# Patient Record
Sex: MALE | Race: OTHER
[De-identification: names, ages, dates, MRNs, and addresses within clinical notes are randomized per-mention and may not be internally consistent; named-entity substitution may affect disease eponyms.]

---

## 2017-03-15 ENCOUNTER — APPOINTMENT (OUTPATIENT)
Dept: INTERNAL MEDICINE | Facility: CLINIC | Age: 42
End: 2017-03-15

## 2017-04-05 ENCOUNTER — APPOINTMENT (OUTPATIENT)
Dept: INTERNAL MEDICINE | Facility: CLINIC | Age: 42
End: 2017-04-05

## 2017-04-05 ENCOUNTER — OUTPATIENT (OUTPATIENT)
Dept: OUTPATIENT SERVICES | Facility: HOSPITAL | Age: 42
LOS: 1 days | Discharge: HOME | End: 2017-04-05

## 2017-04-05 VITALS
SYSTOLIC BLOOD PRESSURE: 117 MMHG | HEART RATE: 65 BPM | WEIGHT: 182 LBS | HEIGHT: 61 IN | BODY MASS INDEX: 34.36 KG/M2 | DIASTOLIC BLOOD PRESSURE: 68 MMHG

## 2017-04-05 DIAGNOSIS — R74.8 ABNORMAL LEVELS OF OTHER SERUM ENZYMES: ICD-10-CM

## 2017-04-05 DIAGNOSIS — E78.2 MIXED HYPERLIPIDEMIA: ICD-10-CM

## 2017-04-05 DIAGNOSIS — E11.9 TYPE 2 DIABETES MELLITUS W/OUT COMPLICATIONS: ICD-10-CM

## 2017-04-05 RX ORDER — ATORVASTATIN CALCIUM 20 MG/1
20 TABLET, FILM COATED ORAL
Qty: 30 | Refills: 5 | Status: ACTIVE | COMMUNITY
Start: 2017-04-05 | End: 1900-01-01

## 2017-04-14 ENCOUNTER — APPOINTMENT (OUTPATIENT)
Dept: NUTRITION | Facility: CLINIC | Age: 42
End: 2017-04-14

## 2017-06-14 ENCOUNTER — APPOINTMENT (OUTPATIENT)
Dept: INTERNAL MEDICINE | Facility: CLINIC | Age: 42
End: 2017-06-14

## 2017-06-27 DIAGNOSIS — I71.2 THORACIC AORTIC ANEURYSM, WITHOUT RUPTURE: ICD-10-CM

## 2017-06-27 DIAGNOSIS — Z01.818 ENCOUNTER FOR OTHER PREPROCEDURAL EXAMINATION: ICD-10-CM

## 2017-06-27 DIAGNOSIS — M16.11 UNILATERAL PRIMARY OSTEOARTHRITIS, RIGHT HIP: ICD-10-CM

## 2017-07-24 ENCOUNTER — OUTPATIENT (OUTPATIENT)
Dept: OUTPATIENT SERVICES | Facility: HOSPITAL | Age: 42
LOS: 1 days | Discharge: HOME | End: 2017-07-24

## 2017-07-24 ENCOUNTER — EMERGENCY (EMERGENCY)
Facility: HOSPITAL | Age: 42
LOS: 0 days | Discharge: HOME | End: 2017-07-24

## 2017-07-24 DIAGNOSIS — Z23 ENCOUNTER FOR IMMUNIZATION: ICD-10-CM

## 2017-07-24 DIAGNOSIS — Z87.891 PERSONAL HISTORY OF NICOTINE DEPENDENCE: ICD-10-CM
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
## 2017-07-24 DIAGNOSIS — T15.02XA FOREIGN BODY IN CORNEA, LEFT EYE, INITIAL ENCOUNTER: ICD-10-CM

## 2017-07-24 DIAGNOSIS — H57.12 OCULAR PAIN, LEFT EYE: ICD-10-CM
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
## 2017-07-24 DIAGNOSIS — S05.02XA INJURY OF CONJUNCTIVA AND CORNEAL ABRASION WITHOUT FOREIGN BODY, LEFT EYE, INITIAL ENCOUNTER: ICD-10-CM
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
## 2018-01-30 ENCOUNTER — EMERGENCY (EMERGENCY)
Facility: HOSPITAL | Age: 43
LOS: 0 days | Discharge: HOME | End: 2018-01-30

## 2018-01-30 DIAGNOSIS — T15.02XA FOREIGN BODY IN CORNEA, LEFT EYE, INITIAL ENCOUNTER: ICD-10-CM

## 2018-11-20 ENCOUNTER — EMERGENCY (EMERGENCY)
Facility: HOSPITAL | Age: 43
LOS: 0 days | Discharge: HOME | End: 2018-11-20
Admitting: PHYSICIAN ASSISTANT

## 2018-11-20 VITALS
HEART RATE: 75 BPM | OXYGEN SATURATION: 99 % | DIASTOLIC BLOOD PRESSURE: 77 MMHG | RESPIRATION RATE: 18 BRPM | SYSTOLIC BLOOD PRESSURE: 151 MMHG | TEMPERATURE: 97 F

## 2018-11-20 VITALS
RESPIRATION RATE: 18 BRPM | DIASTOLIC BLOOD PRESSURE: 81 MMHG | OXYGEN SATURATION: 98 % | TEMPERATURE: 97 F | SYSTOLIC BLOOD PRESSURE: 138 MMHG | HEART RATE: 71 BPM | WEIGHT: 184.53 LBS

## 2018-11-20 DIAGNOSIS — T15.02XA FOREIGN BODY IN CORNEA, LEFT EYE, INITIAL ENCOUNTER: ICD-10-CM

## 2018-11-20 RX ORDER — POLYMYXIN B SULF/TRIMETHOPRIM 10000-1/ML
2 DROPS OPHTHALMIC (EYE) ONCE
Qty: 0 | Refills: 0 | Status: COMPLETED | OUTPATIENT
Start: 2018-11-20 | End: 2018-11-20

## 2018-11-20 RX ADMIN — Medication 2 DROP(S): at 22:28

## 2018-11-20 NOTE — ED PROVIDER NOTE - PROGRESS NOTE DETAILS
3 FBs removed from cornea with small residual piece remaining in cornea.  Discussed with pt that he must follow up with out pt opthalmology tomorrow at clinic or private doctor.  Pt adds no other complaints at this time.  Discussed results with pt.  All questions were answered and return precautions discussed.  Pt is asx and comfortable at this time.  Unremarkable re-exam.  No further concerns at this time from pt.  Will follow up with PMD.  Pt understands and agrees with tx plan.

## 2018-11-20 NOTE — ED PROVIDER NOTE - PHYSICAL EXAMINATION
CONST: Well appearing in NAD  EYES: PERRL, EOMI, +fluorescin uptake at 3'oclock, 3 FBs of cornea not overlying pupil, no sidels signs, visual acuity 20/40 right eye, 20/50 left eye,  Sclera and conjunctiva clear, pain alleviated with tetracaine   ENT: No nasal discharge.  Oropharynx normal appearing, no erythema or exudates. Uvula midline.  NEURO: A&Ox3, No focal deficits. Strength 5/5 with no sensory deficits. Steady gait

## 2018-11-20 NOTE — ED ADULT TRIAGE NOTE - CHIEF COMPLAINT QUOTE
pt thinks he has pieces of steel in his left eye. He was grinding metal and now his left eye is red.

## 2018-11-20 NOTE — ED PROVIDER NOTE - NSFOLLOWUPCLINICS_GEN_ALL_ED_FT
[facility]  Ophthalmolgy  [address], [address]  [place] [address]  Phone: [phone]  Fax:   Follow Up Time: 1-3 Days

## 2018-11-20 NOTE — ED PROVIDER NOTE - NS ED ROS FT
CONST: No fever, chills or body aches  EYES: + pain, + redness. No drainage or visual changes.  ENT: No ear pain or discharge, nasal discharge or congestion. No sore throat  NEURO: No headache, dizziness, paresthesias or LOC

## 2018-11-20 NOTE — ED PROVIDER NOTE - OBJECTIVE STATEMENT
43 y.o male with no sig PMhx presents to the ED for evaluation of FB of left eye since this afternoon.  Pt states that he was cutting with metal saw when he felt acute pain of his left eye.  Since onset noticed that eye was red and irritated.  Admits to mild pain of eye with no alleviating or exacerbating factors. Denies changes in visual acuity, headache, dizziness, N/V, diplopia.  UTD on tetanus.  [id number] used.

## 2018-11-20 NOTE — ED PROVIDER NOTE - MEDICAL DECISION MAKING DETAILS
I have discussed the discharge plan with the patient. The patient agrees with the plan, as discussed.  The patient understands Emergency Department diagnosis is a preliminary diagnosis often based on limited information and that the patient must adhere to the follow-up plan as discussed.  The patient understands that if the symptoms worsen or if prescribed medications do not have the desired/planned effect that the patient may return to the Emergency Department at any time for further evaluation and treatment

## 2020-04-23 ENCOUNTER — EMERGENCY (EMERGENCY)
Facility: HOSPITAL | Age: 45
LOS: 0 days | Discharge: HOME | End: 2020-04-23
Admitting: EMERGENCY MEDICINE
Payer: MEDICAID

## 2020-04-23 VITALS
SYSTOLIC BLOOD PRESSURE: 144 MMHG | HEART RATE: 77 BPM | RESPIRATION RATE: 18 BRPM | DIASTOLIC BLOOD PRESSURE: 82 MMHG | OXYGEN SATURATION: 99 % | TEMPERATURE: 98 F

## 2020-04-23 DIAGNOSIS — H57.10 OCULAR PAIN, UNSPECIFIED EYE: ICD-10-CM
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
## 2020-04-23 DIAGNOSIS — S05.01XA INJURY OF CONJUNCTIVA AND CORNEAL ABRASION WITHOUT FOREIGN BODY, RIGHT EYE, INITIAL ENCOUNTER: ICD-10-CM
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
## 2020-04-23 DIAGNOSIS — F17.200 NICOTINE DEPENDENCE, UNSPECIFIED, UNCOMPLICATED: ICD-10-CM

## 2020-04-23 PROCEDURE — 99283 EMERGENCY DEPT VISIT LOW MDM: CPT

## 2020-04-23 RX ORDER — POLYMYXIN B SULF/TRIMETHOPRIM 10000-1/ML
1 DROPS OPHTHALMIC (EYE) ONCE
Refills: 0 | Status: COMPLETED | OUTPATIENT
Start: 2020-04-23 | End: 2020-04-23

## 2020-04-23 RX ADMIN — Medication 1 DROP(S): at 11:49

## 2020-04-23 NOTE — ED PROVIDER NOTE - NSFOLLOWUPINSTRUCTIONS_ED_ALL_ED_FT
PLEASE PLACE 1 DROP IN RIGHT EYE 4 TIMES A DAY FOR 7 DAYS.     Corneal Abrasion    The cornea is the clear covering at the front and center of the eye. This very thin tissue is made up of many layers. If a scratch or injury causes the corneal epithelium to come off, it is called a corneal abrasion. Symptoms include eye pain, difficulty keeping eye open, and light sensitivity. Do not drive or operate machinery if your eye is patched.    SEEK MEDICAL CARE IF YOU HAVE THE FOLLOWING SYMPTOMS: discharge from eyes, changes in vision, worsening of symptoms.

## 2020-04-23 NOTE — ED PROVIDER NOTE - NSFOLLOWUPCLINICS_GEN_ALL_ED_FT
[facility]  Ophthalmolgy  [address], [address]  [place] [address]  Phone: [phone]  Fax:   Follow Up Time: 1-3 Days

## 2020-04-23 NOTE — ED PROVIDER NOTE - CLINICAL SUMMARY MEDICAL DECISION MAKING FREE TEXT BOX
45y M presenting to ED with FB to right eye. vitals stable. Vision 20/20. eye fluorescein stained showed uptake of dye at the 12 o clock position. consistent with corneal abrasion. eyelid everted no metal FB visualized. no hyphema, no sidal sign.Pt started on polytrim eye drops to right eye and eye clinic follow up given. Strict follow up with eye clinic and return precautions given. pt endorses understanding.

## 2020-04-23 NOTE — ED ADULT TRIAGE NOTE - PATIENT ON (OXYGEN DELIVERY METHOD)
How Severe Is Your Skin Lesion?: mild
Has Your Skin Lesion Been Treated?: not been treated
Is This A New Presentation, Or A Follow-Up?: Skin Lesion
Which Family Member (Optional)?: Brother
room air

## 2020-04-23 NOTE — ED PROVIDER NOTE - PHYSICAL EXAMINATION
GEN: Alert & Oriented x 3, No acute distress. Calm, appropriate.  Head and Neck: Normocephalic, atraumatic. No cervical lymphadenopathy.  ENT:Oral mucosa pink, moist without lesions.   Eyes: PERRL. EOMI. (+) conjunctival injection. No hyphema. Fluorescin stained with stain uptake to 12 o'clock position to right cornea. eyelid everted. no FB visualized. no dylan sign. No scleral icterus. OD/OS/OU Vision 20/20. No periorbital swelling or erythema.   SKIN: no rashes/lesions, no petechiae, no ecchymosis.

## 2020-04-23 NOTE — ED PROVIDER NOTE - OBJECTIVE STATEMENT
The pt is a 45y M with no PMH is presenting to ED with eye pain x 1 day. Pt states he was working and had a piece of metal fly in right eye. Pt endorsing mod, constant pain to right eye. associated with redness. no aggravating or relieving factors. Pt denies photophobia, blurry vision, double vision, foreign body in eye, fever, eye swelling. pt does not wear contacts. tdap up to date.

## 2020-04-23 NOTE — ED PROVIDER NOTE - NS ED ROS FT
GEN: (-) fever, (-) chills, (-) malaise  HEENT: (+) eye pain, (-) double vision, (-) photophobia (-) vision changes, (-) HA, (-) sore throat  SKIN: (-) rashes, (-) new lesions  HEME: (-) bleeding, (-) ecchymosis

## 2020-04-23 NOTE — ED PROVIDER NOTE - PATIENT PORTAL LINK FT
You can access the FollowMyHealth Patient Portal offered by [facility] by registering at the following website: [email or web address]. By joining [email or web address]’s FollowMyHealth portal, you will also be able to view your health information using other applications (apps) compatible with our system.

## 2020-04-24 ENCOUNTER — OUTPATIENT (OUTPATIENT)
Dept: OUTPATIENT SERVICES | Facility: HOSPITAL | Age: 45
LOS: 1 days | Discharge: HOME | End: 2020-04-24
Payer: MEDICAID

## 2020-04-24 DIAGNOSIS — S05.00XS: ICD-10-CM

## 2020-04-24 PROCEDURE — 92012 INTRM OPH EXAM EST PATIENT: CPT

## 2020-04-28 DIAGNOSIS — S05.00XA INJURY OF CONJUNCTIVA AND CORNEAL ABRASION WITHOUT FOREIGN BODY, UNSPECIFIED EYE, INITIAL ENCOUNTER: ICD-10-CM

## 2020-09-23 ENCOUNTER — EMERGENCY (EMERGENCY)
Facility: HOSPITAL | Age: 45
LOS: 0 days | Discharge: HOME | End: 2020-09-23
Attending: EMERGENCY MEDICINE | Admitting: EMERGENCY MEDICINE
Payer: MEDICAID

## 2020-09-23 VITALS
DIASTOLIC BLOOD PRESSURE: 77 MMHG | RESPIRATION RATE: 16 BRPM | TEMPERATURE: 99 F | SYSTOLIC BLOOD PRESSURE: 136 MMHG | OXYGEN SATURATION: 97 % | HEART RATE: 85 BPM
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
## 2020-09-23 DIAGNOSIS — H57.89 OTHER SPECIFIED DISORDERS OF EYE AND ADNEXA: ICD-10-CM
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
## 2020-09-23 DIAGNOSIS — Z23 ENCOUNTER FOR IMMUNIZATION: ICD-10-CM

## 2020-09-23 DIAGNOSIS — S05.01XA INJURY OF CONJUNCTIVA AND CORNEAL ABRASION WITHOUT FOREIGN BODY, RIGHT EYE, INITIAL ENCOUNTER: ICD-10-CM
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
## 2020-09-23 PROCEDURE — 99283 EMERGENCY DEPT VISIT LOW MDM: CPT

## 2020-09-23 RX ORDER — TETANUS TOXOID, REDUCED DIPHTHERIA TOXOID AND ACELLULAR PERTUSSIS VACCINE, ADSORBED 5; 2.5; 8; 8; 2.5 [IU]/.5ML; [IU]/.5ML; UG/.5ML; UG/.5ML; UG/.5ML
0.5 SUSPENSION INTRAMUSCULAR ONCE
Refills: 0 | Status: COMPLETED | OUTPATIENT
Start: 2020-09-23 | End: 2020-09-23

## 2020-09-23 RX ORDER — POLYMYXIN B SULF/TRIMETHOPRIM 10000-1/ML
1 DROPS OPHTHALMIC (EYE) ONCE
Refills: 0 | Status: COMPLETED | OUTPATIENT
Start: 2020-09-23 | End: 2020-09-23

## 2020-09-23 RX ADMIN — Medication 1 DROP(S): at 18:39

## 2020-09-23 RX ADMIN — TETANUS TOXOID, REDUCED DIPHTHERIA TOXOID AND ACELLULAR PERTUSSIS VACCINE, ADSORBED 0.5 MILLILITER(S): 5; 2.5; 8; 8; 2.5 SUSPENSION INTRAMUSCULAR at 18:40

## 2020-09-23 NOTE — ED ADULT TRIAGE NOTE - CHIEF COMPLAINT QUOTE
Pt states he has "some metal pieces" in his right eye; pt was using a  and got some metal in his eye yesterday afternoon. Pt denies difficulty seeing.

## 2020-09-23 NOTE — ED PROVIDER NOTE - CLINICAL SUMMARY MEDICAL DECISION MAKING FREE TEXT BOX
No foreign body on exam.  (+) Corneal abrasion with fluorescein staining.  Tetanus booster, Abx drops and OTC NSAIDs.

## 2020-09-23 NOTE — ED PROVIDER NOTE - OBJECTIVE STATEMENT
46 y/o male presents to the ED c/o "I was grinding metal yesterday wearing glasses and I felt like some metal got into my right eye. It is red and irritated." no change in vision/ ha/ weakness

## 2020-09-23 NOTE — ED PROVIDER NOTE - NSFOLLOWUPCLINICS_GEN_ALL_ED_FT
[facility]  Ophthalmolgy  [address], [address]  [place] [address]  Phone: [phone]  Fax:   Follow Up Time:

## 2020-09-23 NOTE — ED PROVIDER NOTE - NSFOLLOWUPINSTRUCTIONS_ED_ALL_ED_FT
Corneal Abrasion    The cornea is the clear covering at the front and center of the eye. This very thin tissue is made up of many layers. If a scratch or injury causes the corneal epithelium to come off, it is called a corneal abrasion. Symptoms include eye pain, difficulty keeping eye open, and light sensitivity. Do not drive or operate machinery if your eye is patched.    SEEK MEDICAL CARE IF YOU HAVE THE FOLLOWING SYMPTOMS: discharge from eyes, changes in vision, worsening of symptoms.     Polytrim drops 1 drop right eye every 4 hours for 1 week

## 2020-09-23 NOTE — ED PROVIDER NOTE - CARE PLAN
Principal Discharge DX:	Corneal abrasion, right, initial encounter  Secondary Diagnosis:	Need for tetanus booster

## 2020-09-23 NOTE — ED PROVIDER NOTE - ATTENDING CONTRIBUTION TO CARE
44 y/o male with no relevant PMHx presents to ED with right eye irritation, foreign body sensation s/p grinding metal yesterday.  Was wearing glasses.  No blurry or double vision.  No other complaints.  Tetanus not up to date.  PE:  agree with above.  A/P:  Corneal Abrasion, r/o foreign body.

## 2020-09-23 NOTE — ED PROVIDER NOTE - PATIENT PORTAL LINK FT
You can access the FollowMyHealth Patient Portal offered by [facility] by registering at the following website: [email or web address]. By joining [email or web address] [email or web address] portal, you will also be able to view your health information using other applications (apps) compatible with our system.

## 2020-11-15 ENCOUNTER — EMERGENCY (EMERGENCY)
Facility: HOSPITAL | Age: 45
LOS: 0 days | Discharge: HOME | End: 2020-11-15
Attending: EMERGENCY MEDICINE | Admitting: EMERGENCY MEDICINE
Payer: MEDICAID

## 2020-11-15 VITALS
HEART RATE: 59 BPM | TEMPERATURE: 97 F | OXYGEN SATURATION: 98 % | RESPIRATION RATE: 18 BRPM | DIASTOLIC BLOOD PRESSURE: 60 MMHG | SYSTOLIC BLOOD PRESSURE: 122 MMHG

## 2020-11-15 DIAGNOSIS — H20.012 PRIMARY IRIDOCYCLITIS, LEFT EYE: ICD-10-CM

## 2020-11-15 DIAGNOSIS — T15.02XA FOREIGN BODY IN CORNEA, LEFT EYE, INITIAL ENCOUNTER: ICD-10-CM

## 2020-11-15 DIAGNOSIS — H53.9 UNSPECIFIED VISUAL DISTURBANCE: ICD-10-CM
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
## 2020-11-15 DIAGNOSIS — S05.02XA INJURY OF CONJUNCTIVA AND CORNEAL ABRASION WITHOUT FOREIGN BODY, LEFT EYE, INITIAL ENCOUNTER: ICD-10-CM

## 2020-11-15 PROCEDURE — 99283 EMERGENCY DEPT VISIT LOW MDM: CPT

## 2020-11-15 RX ORDER — POLYMYXIN B SULF/TRIMETHOPRIM 10000-1/ML
1 DROPS OPHTHALMIC (EYE) ONCE
Refills: 0 | Status: COMPLETED | OUTPATIENT
Start: 2020-11-15 | End: 2020-11-15

## 2020-11-15 RX ADMIN — Medication 1 DROP(S): at 10:06

## 2020-11-15 NOTE — ED PROCEDURE NOTE - ATTENDING CONTRIBUTION TO CARE
I was present for and supervised the key critical aspects of the procedures performed during the care of the patient.  QTIP used to remove

## 2020-11-15 NOTE — ED PROVIDER NOTE - NS ED ROS FT
Constitutional: See HPI.  Eyes: see hpi   ENMT: No hearing changes, pain, discharge or infections.   Cardiac: No SOB or edema. No chest pain with exertion.  Respiratory: No cough or respiratory distress. No hemoptysis. No history of asthma or RAD.  Neuro: No headache   Skin: No skin rash.  Except as documented in the HPI, all other systems are negative.

## 2020-11-15 NOTE — ED ADULT NURSE NOTE - NSIMPLEMENTINTERV_GEN_ALL_ED
Implemented All Universal Safety Interventions:  [place] to call system. Call bell, personal items and telephone within reach. Instruct patient to call for assistance. Room bathroom lighting operational. Non-slip footwear when patient is off stretcher. Physically safe environment: no spills, clutter or unnecessary equipment. Stretcher in lowest position, wheels locked, appropriate side rails in place.

## 2020-11-15 NOTE — ED PROVIDER NOTE - OBJECTIVE STATEMENT
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 Patient denies fevers/chills, no other complaints.

## 2020-11-15 NOTE — ED PROVIDER NOTE - ATTENDING CONTRIBUTION TO CARE
45yr old male, non contact lens wearer her for left eye complaint. 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  now having pain to left eye. on exam pt with left conjunctival injection, small tiny black FB to 11 O'clock position, perrl eomi, vision grossly intact, + abrasion onfluroscein, negative dylan    impression FB with abrasion, FB removed, drops given, optho outpt advised

## 2020-11-15 NOTE — ED PROVIDER NOTE - PATIENT PORTAL LINK FT
You can access the FollowMyHealth Patient Portal offered by [facility] by registering at the following website: [email or web address]. By joining [employer]’s FollowMyHealth portal, you will also be able to view your health information using other applications (apps) compatible with our system.

## 2020-11-15 NOTE — ED PROVIDER NOTE - PHYSICAL EXAMINATION
VITAL SIGNS: I have reviewed nursing notes and confirm.  CONSTITUTIONAL: well-appearing, non-toxic, NAD  SKIN: Warm dry, normal skin turgor  HEAD: NCAT  EYES: EOMI, PERRLA, no scleral icterus, left eye injected with noted 11oclock corneal abrasion and foreign body, + consensual light reflex   ENT: Moist mucous membranes,  NECK: Supple; non tender. Full ROM.   CARD: RRR, no murmurs, rubs or gallops  RESP: clear to ausculation b/l.  No rales, rhonchi, or wheezing.  NEURO: Normal gait.  PSYCH: Cooperative, appropriate.

## 2020-11-15 NOTE — ED PROVIDER NOTE - NSFOLLOWUPCLINICS_GEN_ALL_ED_FT
[facility]  Ophthalmolgy  [address], [address]  [place] [address]  Phone: [phone]  Fax:   Follow Up Time: 1-3 Days

## 2020-11-15 NOTE — ED PROVIDER NOTE - NSFOLLOWUPINSTRUCTIONS_ED_ALL_ED_FT
Corneal Abrasion    The cornea is the clear covering at the front and center of the eye. This very thin tissue is made up of many layers. If a scratch or injury causes the corneal epithelium to come off, it is called a corneal abrasion. Symptoms include eye pain, redness, tearing, difficulty keeping eye open, and light sensitivity. Do not drive or operate machinery if your eye is patched.  Antibiotic eye drops or ointment may be prescribed to reduce the risk of infection.  It is important to follow up with an ophthalmologist (eye doctor) to ensure proper healing.    SEEK IMMEDIATE MEDICAL CARE IF YOU HAVE ANY OF THE FOLLOWING SYMPTOMS: discharge from eyes, changes in vision, fever, or swelling or if symptoms worsen    Ear Foreign Body  Medically reviewed on Dec 21, 2018      Care Notes Overview|Aftercare Instructions|Ambulatory Care|Discharge Care|Inpatient Care|En EspañolWHAT YOU SHOULD KNOW:  Advertisement•An ear foreign body is anything that gets stuck in your ear canal other than earwax. This may include food, toy pieces, beads, buttons, disk batteries, cotton swab, paper, or insects. Foreign bodies are usually trapped in the outer ear canal. The outer ear canal, or external auditory canal, is the tube from the opening of your ear to the eardrum. You may have pain or fullness in the ear, or trouble hearing if the ear canal is blocked. Blood or thick discharge (drainage) may come out from the affected ear. If the foreign body is an insect, you may feel movement or hear buzzing.        •Diagnosis of ear foreign body may include a detailed health history and careful checking of the ear. Removal of the foreign body from the ear is the main goal of treatment. This may be done using gentle flushing of the ear canal with warm water, suction, or instruments. Live insects are usually killed with a liquid before being removed. Surgery may be needed to remove a deep foreign body or treat ear damage. With treatment, the foreign body will be removed from the ear, and more serious problems can be prevented.  AFTER YOU LEAVE:  Medicines:  •Keep a current list of your medicines: Include the amounts, and when, how, and why you take them. Take the list or the pill bottles to follow-up visits. Carry your medicine list with you in case of an emergency. Throw away old medicine lists. Use vitamins, herbs, or food supplements only as directed.  •Take your medicine as directed: Call your primary healthcare provider if you think your medicine is not working as expected. Tell him about any medicine allergies, and if you want to quit taking or change your medicine.  •Antibiotics: This medicine is given to fight or prevent an infection caused by bacteria. Always take your antibiotics exactly as ordered by your primary healthcare provider. Do not stop taking your medicine unless directed by your primary healthcare provider. Never save antibiotics or take leftover antibiotics that were given to you for another illness. Make sure that the tip of the bottle does not touch your skin if you are using ear drops.  Ask for information about where and when to go for follow-up visits:  For continuing care, treatments, or home services, ask for more information.    What to do if you have a foreign body stuck in your ear:  •Do not put anything in your ear to try to take the foreign body out. This may push the object deeper into the ear canal.  •If you feel a live insect moving in your ear, you may place a few drops of mineral oil in your ear. This will help kill the bug and decrease discomfort. Do not drop oil in the ear canal if you have a known tear in your eardrum. Do not put your finger inside your ear.  •Teach your child to tell an adult right away if he has symptoms of a foreign body in his ear.  CONTACT A CAREGIVER IF:  •You have a fever.  •You have trouble hearing, or you hear ringing sounds.  •You have questions or concerns about your condition, treatment, or care.  SEEK CARE IMMEDIATELY IF:  •You feel dizzy.  •You have discharge or blood coming out from your ear.  •Your ear pain does not go away or gets worse

## 2020-11-15 NOTE — ED PROVIDER NOTE - CARE PLAN
Principal Discharge DX:	Corneal abrasion  Secondary Diagnosis:	Traumatic iritis  Secondary Diagnosis:	Foreign body in eye

## 2021-09-02 NOTE — ED PROVIDER NOTE - NS ED ATTENDING STATEMENT MOD
I have personally performed a face to face diagnostic evaluation on this patient. I have reviewed the ACP note and agree with the history, exam and plan of care, except as noted.
self/Yes

## 2021-10-13 NOTE — ED ADULT TRIAGE NOTE - ARRIVAL FROM
Final Ascites Aerobic Bacterial Culture report is NEGATIVE.    No treatment or change in treatment per [facility] Lab Result culture protocol.  Home

## 2022-08-12 NOTE — ED PROCEDURE NOTE - PROCEDURE DATE TIME, MLM
25-Nov-2018 16:27 Ketoconazole Counseling:   Patient counseled regarding improving absorption with orange juice.  Adverse effects include but are not limited to breast enlargement, headache, diarrhea, nausea, upset stomach, liver function test abnormalities, taste disturbance, and stomach pain.  There is a rare possibility of liver failure that can occur when taking ketoconazole. The patient understands that monitoring of LFTs may be required, especially at baseline. The patient verbalized understanding of the proper use and possible adverse effects of ketoconazole.  All of the patient's questions and concerns were addressed.

## 2023-02-28 ENCOUNTER — EMERGENCY (EMERGENCY)
Facility: HOSPITAL | Age: 48
LOS: 0 days | Discharge: HOME | End: 2023-02-28
Attending: EMERGENCY MEDICINE
Payer: MEDICAID

## 2023-02-28 VITALS
TEMPERATURE: 98 F | SYSTOLIC BLOOD PRESSURE: 162 MMHG | HEART RATE: 64 BPM | DIASTOLIC BLOOD PRESSURE: 90 MMHG | OXYGEN SATURATION: 96 % | RESPIRATION RATE: 20 BRPM
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
## 2023-02-28 DIAGNOSIS — T15.02XA FOREIGN BODY IN CORNEA, LEFT EYE, INITIAL ENCOUNTER: ICD-10-CM
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
## 2023-02-28 DIAGNOSIS — F17.200 NICOTINE DEPENDENCE, UNSPECIFIED, UNCOMPLICATED: ICD-10-CM

## 2023-02-28 PROCEDURE — 65220 REMOVE FOREIGN BODY FROM EYE: CPT | Mod: LT

## 2023-02-28 PROCEDURE — 99284 EMERGENCY DEPT VISIT MOD MDM: CPT

## 2023-02-28 RX ORDER — MOXIFLOXACIN HCL 0.5 %
1 DROPS OPHTHALMIC (EYE)
Qty: 1 | Refills: 0
Start: 2023-02-28 | End: 2023-03-06

## 2023-02-28 RX ORDER — IBUPROFEN 200 MG
600 TABLET ORAL ONCE
Refills: 0 | Status: COMPLETED | OUTPATIENT
Start: 2023-02-28 | End: 2023-02-28

## 2023-02-28 NOTE — ED PROVIDER NOTE - CLINICAL SUMMARY MEDICAL DECISION MAKING FREE TEXT BOX
TALYAK: language line  for Hungarian    used. 47-year-old male presents with foreign body sensation to left eye after he was cutting metal and felt a piece go into his eye.  Denies any change in visual acuity.  No other complaints.  On exam has punctate metallic foreign body in the left cornea, no other abrasions or uptake on fluorescein exam.  Pablo's negative.  Visual acuity reassuring.  Unable to remove with cotton swab so ophthalmology consulted and able to remove.  Patient discharged with return precautions and ophthalmology follow-up as well as antibiotic drops.

## 2023-02-28 NOTE — ED ADULT TRIAGE NOTE - CCCP TRG CHIEF CMPLNT
.Telephone call with patient. Explained results of phone calls with [facility] and Dr. Kolby Quiñonez. Patient to inform this writer should he not receive his medication/[employer]ul[employer]. foreign body eye

## 2023-02-28 NOTE — CONSULT NOTE ADULT - SUBJECTIVE AND OBJECTIVE BOX
Patient is a 48 y/o man no past ocular hx presents with a 1 day hx of foreign body in his eye. Was shaving metal when he felt something go in his left eye. Has had pain and foreign body sensation since. No other ocular complaints. No flashes, floaters, discharge.    VAcc 20/20, 20/40  IOP 12/10  EOM full  pupils 3-2 no apd     lids/lashes: nml ou  conjunctiva w&q od, trace injection OS  cornea: clear os, .8mm rusted/metal fb in infero nasal cornea paracentrally  iris: rounds and reactive ou  lens: clear ou     DFE     Optic disc: CD .3 ou, sharp and pink  Macula: flat ou  vessels: normal course and caliber ou  periphery: flat, no holes or tears ou     US OS:  no FB noted, no tears or detachments    A&P    Corneal foreign body OS  - 1 day hx of fbs after shaving metal  -.8 mm rusted foreign body paracentrally at around 7 oclock  - Poor view on DFE after corneal manipulation but no FB noted and confirmed with US  Plan:  - FB removed with 25g needle. Moderately deep, nearly full removed although cautiously given depth. Pablo negative before and after removal  - fornices swept  - recommend vigamox drops three times a day in the left eye  - counselled on need for eye protection at work   - return to optho clinic at [facility] tomorrow for re-anthony Jade, PGY2

## 2023-02-28 NOTE — ED PROVIDER NOTE - OBJECTIVE STATEMENT
47 year old M presenting to er with fb sensation to L eye. Pt sts was cutting metal today when felt piece of metal get into eye at 8am. Now having pain/fb sensation and tearing from L eye. No other trauma/injuries. Pt denies any contact lens use, headache, nausea, vomiting, dizziness, double vision.

## 2023-02-28 NOTE — ED PROVIDER NOTE - NSFOLLOWUPCLINICS_GEN_ALL_ED_FT
[facility]  Ophthalmolgy  [address], [address]  [place] [address]  Phone: [phone]  Fax:

## 2023-02-28 NOTE — ED PROVIDER NOTE - NSFOLLOWUPINSTRUCTIONS_ED_ALL_ED_FT
Eye Foreign Body    WHAT YOU NEED TO KNOW:    You may have pain, sensitivity to light, or blurry vision for a few days.     DISCHARGE INSTRUCTIONS:    Return to the emergency department if:     You suddenly lose your vision.       You have severe eye pain.     Contact your healthcare provider or ophthalmologist if:     You have new or worse eye swelling.       Your symptoms do not get better, even after the foreign body is removed.       You have white or yellow fluid draining from your eye.       You have questions or concerns about your condition or care.     Medicines: You may need any of the following:     Eye drops or eye ointment may be given to prevent an infection and decrease pain.       NSAIDs, such as ibuprofen, help decrease swelling, pain, and fever. NSAIDs can cause stomach bleeding or kidney problems in certain people. If you take blood thinner medicine, always ask your healthcare provider if NSAIDs are safe for you. Always read the medicine label and follow directions.      Prescription pain medicine may be given. Ask your healthcare provider how to take this medicine safely. Some prescription pain medicines contain acetaminophen. Do not take other medicines that contain acetaminophen without talking to your healthcare provider. Too much acetaminophen may cause liver damage. Prescription pain medicine may cause constipation. Ask your healthcare provider how to prevent or treat constipation.       Take your medicine as directed. Contact your healthcare provider if you think your medicine is not helping or if you have side effects. Tell him of her if you are allergic to any medicine. Keep a list of the medicines, vitamins, and herbs you take. Include the amounts, and when and why you take them. Bring the list or the pill bottles to follow-up visits. Carry your medicine list with you in case of an emergency.    Help your eye heal:     Do not rub your eye. This may cause more damage or infection.       Do not wear your contacts lenses until your eye heals. Ask your healthcare provider how long to follow this direction.       Wear sunglasses as directed. Sunglasses help protect the eye and decrease sensitivity to light.     Prevent another EFB:     Wear safety glasses, eye shields, or goggles. These items can prevent eye injury. Make sure the eyewear wraps around the sides of your face. Wear these items while you work with chemicals, metal, wood, or bodily fluids such as blood. Also wear protective eyewear during sports such as racquetball or swimming. Do not use regular eye glasses for eye protection. They will not protect your eyes from foreign bodies or chemicals.       Use contact lenses as directed. Wash your hands before you clean, insert, or remove your contacts. Insert and remove contact lenses correctly. Clean and change your contacts as directed to help prevent eye damage or infection.     Follow up with your healthcare provider or ophthalmologist in 1 to 2 days: Write down your questions so you remember to ask them during your visits.       © Copyright [employer] 2019 All illustrations and images included in CareNotes are the copyrighted property of [employer] or [employer].

## 2023-02-28 NOTE — ED PROVIDER NOTE - PHYSICAL EXAMINATION
CONSTITUTIONAL: Well-appearing; well-nourished; in no apparent distress.   EYES: PERRL; EOM intact. VA 20/25 in b/l eyes.  on fluorescin stain L eye + small corneal fb seen at 11 o clock. No ulcerations or abrasions seen  NECK: Supple; non-tender; no cervical lymphadenopathy.   CARDIOVASCULAR: Normal S1, S2; no murmurs, rubs, or gallops.   RESPIRATORY: Normal chest excursion with respiration; breath sounds clear and equal bilaterally; no wheezes, rhonchi, or rales.  MS: No evidence of trauma or deformity. Normal ROM in all four extremities; non-tender to palpation; distal pulses are normal.   SKIN: Normal for age and race; warm; dry; good turgor; no apparent lesions or exudate.   NEURO/PSYCH: A & O x 4; grossly unremarkable. mood and manner are appropriate. Grooming and personal hygiene are appropriate.

## 2024-02-28 ENCOUNTER — EMERGENCY (EMERGENCY)
Facility: HOSPITAL | Age: 49
LOS: 0 days | Discharge: HOME | End: 2024-02-28
Attending: STUDENT IN AN ORGANIZED HEALTH CARE EDUCATION/TRAINING PROGRAM
Payer: MEDICAID

## 2024-02-28 VITALS
OXYGEN SATURATION: 99 % | TEMPERATURE: 99 F | RESPIRATION RATE: 20 BRPM | SYSTOLIC BLOOD PRESSURE: 158 MMHG | DIASTOLIC BLOOD PRESSURE: 78 MMHG | HEART RATE: 67 BPM
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
## 2024-02-28 DIAGNOSIS — S61.011A LACERATION WITHOUT FOREIGN BODY OF RIGHT THUMB WITHOUT DAMAGE TO NAIL, INITIAL ENCOUNTER: ICD-10-CM
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
## 2024-02-28 PROCEDURE — 73130 X-RAY EXAM OF HAND: CPT | Mod: 26,RT

## 2024-02-28 PROCEDURE — 12002 RPR S/N/AX/GEN/TRNK2.6-7.5CM: CPT

## 2024-02-28 PROCEDURE — 99283 EMERGENCY DEPT VISIT LOW MDM: CPT | Mod: 25

## 2024-02-28 PROCEDURE — 73130 X-RAY EXAM OF HAND: CPT | Mod: RT

## 2024-02-28 PROCEDURE — 99284 EMERGENCY DEPT VISIT MOD MDM: CPT | Mod: 25

## 2024-02-28 NOTE — ED PROVIDER NOTE - CLINICAL SUMMARY MEDICAL DECISION MAKING FREE TEXT BOX
48-year-old male, no pertinent past medical history, presenting with right thumb injury while using a table saw, just prior to arrival, pain with touch, no relieving factors, associated symptoms.  Denies other trauma, numbness, tingling, weakness, injuries elsewhere.  Tdap about 1 year ago.  Minimally limited range of motion of right thumb due to pain but able to extend at MCP and PIP as well as flex.  4 cm laceration extending from medial to lateral thumb proximal to distal.  No visible tendon.  Imaging ordered and reviewed.  Wound thoroughly cleansed.  Digital block applied.  Laceration repaired.  Discussed return precautions and follow-up with hand within 24 hours.  Patient comfortable with plan. 48-year-old male, no pertinent past medical history, presenting with right thumb injury while using a table saw, just prior to arrival, pain with touch, no relieving factors, associated symptoms.  Denies other trauma, numbness, tingling, weakness, injuries elsewhere.  Tdap about 1 year ago.  Minimally limited range of motion of right thumb due to pain but able to extend at MCP and PIP as well as flex.  4 cm laceration extending from medial to lateral thumb proximal to distal.  No visible tendon.  Imaging ordered and reviewed.  Wound thoroughly cleansed.  Digital block applied.  Laceration repaired.  Discussed return precautions and follow-up with hand within 24-48 hours.  Patient comfortable with plan.

## 2024-02-28 NOTE — ED PROVIDER NOTE - CARE PROVIDER_API CALL
Gavin Metz  Plastic Surgery  [address] [place] [id number]  Phone: [phone]  Fax: [phone]  Follow Up Time: 1-3 Days

## 2024-02-28 NOTE — ED PROVIDER NOTE - NSFOLLOWUPINSTRUCTIONS_ED_ALL_ED_FT
Please return for suture removal in 10-14 days.  Please follow up with hand specialist  in 1-3 days.    Sutured Wound Care  Sutures are stitches that can be used to close wounds. Sutures come in different materials. They may break down as your wound heals (absorbable), or they may need to be removed (nonabsorbable). Taking care of your wound properly can help to prevent pain and infection. It can also help your wound heal more quickly. Follow instructions from your health care provider about how to care for your sutured wound.    Supplies needed:  Soap and water.  A clean, dry towel.  Wound cleanser or saline, if needed.  A clean gauze or bandage (dressing), if needed.  Antibiotic ointment, if told by your health care provider.  How to care for your sutured wound  Two stitched wounds. One is normal. The other is red with pus and infected.  Keep the wound completely dry for the first 24 hours, or for as long as told by your health care provider. After 24–48 hours, you may shower or bathe as told by your health care provider. Do not soak or submerge the wound in water until the sutures have been removed.  After the first 24 hours, clean the wound once a day, or as often as told by your health care provider. Use the following steps:  Wash and rinse the wound as told by your health care provider.  Pat the wound dry with a clean towel. Do not rub the wound.  After cleaning the wound, apply a thin layer of antibiotic ointment as told by your health care provider. This will prevent infection and keep the dressing from sticking to the wound.  Follow instructions from your health care provider about how to change your dressing. Make sure you:  Wash your hands with soap and water for at least 20 seconds before and after you change your dressing. If soap and water are not available, use hand .  Change your dressing at least once a day, or as often as told by your health care provider. If your dressing gets wet or dirty, change it.  Leave sutures and other skin closures, such as adhesive strips or skin glue, in place. These skin closures may need to stay in place for 2 weeks or longer. If adhesive strip edges start to loosen and curl up, you may trim the loose edges. Do not remove adhesive strips completely unless your health care provider tells you to do that.  Check your wound every day for signs of infection. Watch for:  Redness, swelling, or pain.  Fluid or blood.  New warmth, a rash, or hardness at the wound site.  Pus or a bad smell.  Have the sutures removed as told by your health care provider.  Follow these instructions at home:  Medicines    Take or apply over-the-counter and prescription medicines only as told by your health care provider.  If you were prescribed an antibiotic medicine or ointment, take or apply it as told by your health care provider. Do not stop using the antibiotic even if your condition improves.  General instructions    To help reduce scarring after your wound heals, cover your wound with clothing or apply sunscreen of at least 30 SPF whenever you are outside.  Do not scratch or pick at your wound.  Avoid stretching your wound.  Raise (elevate) the injured area above the level of your heart while you are sitting or lying down, if possible.  Eat a diet that includes protein, vitamin A, and vitamin C to help the wound heal.  Drink enough fluid to keep your urine pale yellow.  Keep all follow-up visits. This is important.  Contact a health care provider if:  You received a tetanus shot and you have swelling, severe pain, redness, or bleeding at the injection site.  Your wound breaks open or you notice something coming out if it, such as wood or glass.  You have any of these signs of infection:  Redness, swelling, or pain around your wound.  Fluid or blood coming from your wound.  New warmth, a rash, or hardness around the wound.  A fever.  The skin near your wound changes color.  You have pain that does not get better with medicine.  You develop numbness around the wound.  Get help right away if:  You develop severe swelling or more pain around your wound.  You have pus or a bad smell coming from your wound.  You develop painful lumps near your wound or anywhere on your body.  You have a red streak spreading out from your wound.  The wound is on your hand or foot and:  Your fingers or toes look pale or bluish.  You cannot properly move a finger or toe.  You have numbness that is spreading down your hand, foot, fingers, or toes.  Summary  Sutures are stitches that can be used to close wounds.  Taking care of your wound properly can help to prevent pain and infection.  Keep the wound completely dry for the first 24 hours, or for as long as told by your health care provider. After 24–48 hours, you may shower or bathe as told by your health care provider.  To help with healing, eat foods that are rich in protein, vitamin A, and vitamin C.  This information is not intended to replace advice given to you by your health care provider. Make sure you discuss any questions you have with your health care provider.

## 2024-02-28 NOTE — ED PROVIDER NOTE - OBJECTIVE STATEMENT
Pt is a 49y/o male here for eval of complex lac to right thumb from table-saw sustained earlier today. TDAP is uptodate

## 2024-02-28 NOTE — ED PROVIDER NOTE - PHYSICAL EXAMINATION
VITAL SIGNS: I have reviewed nursing notes and confirm.  CONSTITUTIONAL: Well-developed; well-nourished  SKIN: complex, jagged, 3cm circular lac to right thumb  HEAD: Normocephalic; atraumatic.  EYES:  conjunctiva and sclera clear.  ENT: No nasal discharge; airway clear.  EXT: full rom of affected digit Normal ROM.  No clubbing, cyanosis or edema.   NEURO: Alert, oriented, grossly unremarkable

## 2024-02-28 NOTE — ED PROVIDER NOTE - PATIENT PORTAL LINK FT
You can access the FollowMyHealth Patient Portal offered by [facility] by registering at the following website: [email or web address]. By joining [employer]’s FollowMyHealth portal, you will also be able to view your health information using other applications (apps) compatible with our system.

## 2024-04-12 ENCOUNTER — EMERGENCY (EMERGENCY)
Facility: HOSPITAL | Age: 49
LOS: 0 days | Discharge: HOME | End: 2024-04-12
Attending: STUDENT IN AN ORGANIZED HEALTH CARE EDUCATION/TRAINING PROGRAM
Payer: MEDICAID

## 2024-04-12 VITALS
WEIGHT: 175.05 LBS | HEART RATE: 80 BPM | SYSTOLIC BLOOD PRESSURE: 170 MMHG | TEMPERATURE: 98 F | DIASTOLIC BLOOD PRESSURE: 70 MMHG | RESPIRATION RATE: 18 BRPM | OXYGEN SATURATION: 98 %

## 2024-04-12 DIAGNOSIS — T15.91XA FOREIGN BODY ON EXTERNAL EYE, PART UNSPECIFIED, RIGHT EYE, INITIAL ENCOUNTER: ICD-10-CM
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
## 2024-04-12 DIAGNOSIS — S05.01XA INJURY OF CONJUNCTIVA AND CORNEAL ABRASION WITHOUT FOREIGN BODY, RIGHT EYE, INITIAL ENCOUNTER: ICD-10-CM
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
## 2024-04-12 PROCEDURE — 99283 EMERGENCY DEPT VISIT LOW MDM: CPT

## 2024-04-12 PROCEDURE — 99284 EMERGENCY DEPT VISIT MOD MDM: CPT

## 2024-04-12 RX ORDER — OFLOXACIN 0.3 %
1 DROPS OPHTHALMIC (EYE) ONCE
Refills: 0 | Status: DISCONTINUED | OUTPATIENT
Start: 2024-04-12 | End: 2024-04-12

## 2024-04-12 NOTE — ED PROVIDER NOTE - NSFOLLOWUPINSTRUCTIONS_ED_ALL_ED_FT
Use gotas para los ojos 2 gotas en el abdulaziz derecho 4 veces al día ashley 5 días.    Abrasión corneal  Corneal Abrasion  An eye showing the cornea and eyelid.   Leoncio abrasión corneal es un rasguño o lesión en el tejido transparente en la parte delantera del abdulaziz (córnea). Puede ser muy dolorosa. La córnea protege el abdulaziz y ayuda a enfocar la visión. La córnea está formada por varias capas, lisbeth la capa superficial es tiffany de los tejidos más sensibles del cuerpo.    Leoncio abrasión corneal se ernesto rápidamente cuando se trata. Si no se trata, puede infectarse y provocar leoncio llaga abierta (úlcera). [place] puede causar cicatrices, y el tejido cicatricial puede afectar la visión. A veces, después de que la abrasión cicatriza, otra abrasión puede ocurrir en el mismo lugar.    ¿Cuáles son las causas?  La abrasión corneal puede deberse a lo siguiente:  Un golpe o pinchazo en el abdulaziz.  Leoncio sustancia arenosa o irritante (cuerpo extraño) en el abdulaziz.  Frotar demasiado los ojos.  Ojos muy secos.  Ciertas infecciones oculares.  Lentes de contacto que no se ajustan de manera adecuada o que se usan ashley mucho tiempo. La lesión también puede ocurrir al ponerse o quitarse las lentes de contacto.  Cirugía ocular.  Ciertos problemas en la córnea que hacen que sea más probable que se produzca leoncio abrasión corneal.  A veces, la causa es desconocida.    ¿Cuáles son los signos o síntomas?  Los síntomas de esta afección incluyen:  Dolor ocular. El dolor puede empeorar al abrir y cerrar el abdulaziz, o al moverlo.  Sensación de que tiene algo que pincha o metido en el abdulaziz.  Lagrimeo, enrojecimiento y sensibilidad a la yesy.  Dificultad para mantener los ojos abiertos o imposibilidad de mantenerlos abiertos.  Visión borrosa.  Dolor de hernando.  ¿Cómo se diagnostica?  Eloncio abrasión corneal se puede diagnosticar en función de los antecedentes médicos, los síntomas y un examen ocular. Es posible que deba consultar a un médico especialista en afecciones oculares (oftalmólogo u optometrista).    Antes del examen ocular, es posible que le administren gotas oftálmicas para adormecer el abdulaziz. También es posible que le coloquen un tinte con un gotero o con leoncio pequeña wong de papel. Peggy tinte ayuda al oculista a juaquin mejor la lesión al usar leoncio yesy o un oftalmoscopio (lámpara de hendidura).    ¿Cómo se trata?  El tratamiento puede variar según la causa de la abrasión corneal. Puede incluir lo siguiente:  Lavado del abdulaziz.  Quitar todo lo que esté atascado en el abdulaziz.  Usar gotas o ungüentos con antibiótico para tratar o prevenir leoncio infección.  Usar gotas oftálmicas que disminuyen la inflamación y el dolor.  Usar gotas o ungüentos con corticoesteroides para tratar el enrojecimiento, la irritación o la inflamación.  Aplicar un paño frío y húmedo (compresa fría) o leoncio compresa de hielo para aliviar el dolor.  Nate analgésicos. Pueden incluir medicamentos de venta vasyl, stew ibuprofeno. Si tiene leoncio abrasión corneal manav o profunda, se puede recetar un medicamento opioide.  Para las abrasiones grandes, también podría usarse un parche ocular o leoncio lente de contacto blanda de vendaje. Leoncio lente de contacto blanda de vendaje protege la córnea mientras se ernesto. Esta alternativa no se utiliza si la abrasión corneal está relacionada con el uso de lentes de contacto. [place] se debe a que es más probable que contraiga leoncio infección ocular.    Siga estas instrucciones en cedeño casa:  Medicamentos    Si le recetaron gotas o leoncio pomada, aplíqueselas según las indicaciones del médico. Es posible que le indiquen que use lo siguiente:  Gotas para los ojos o ungüento con antibiótico. No deje de usarlos aunque comience a sentirse mejor.  Gotas oftálmicas que humedecen el abdulaziz (gotas oftálmicas lubricantes).  Pregúntele al médico si el medicamento que le recetó:  Requiere que evite conducir o usar maquinaria.  Puede causarle estreñimiento. Es posible que tenga que nate estas medidas para prevenir o tratar el estreñimiento:  Beber suficiente líquido para mantener el pis (orina) de color amarillo pálido.  Usar medicamentos recetados o de venta vasyl.  Consumir alimentos ricos en fibra, stew frijoles, cereales integrales, y frutas y verduras frescas.  Limitar el consumo de alimentos ricos en grasa y azúcares procesados, stew alimentos fritos o dulces.  Use los medicamentos de venta vasyl y los recetados solamente stew se lo haya indicado el médico.  Cuidado de los ojos    Descanse los ojos. Evite la yesy brillante y el uso excesivo (esfuerzo) de los ojos. Use gafas de sol.  No se toque ni se frote el abdulaziz. No se lave el abdulaziz.  Pregúntele al médico si puede usar leoncio compresa fría en el abdulaziz para aliviar el dolor.  Si tiene un parche ocular:  Úselo stew se lo haya indicado el médico.  Siga las instrucciones del médico acerca de cuándo quitárselo.  Si tiene leoncio lente de contacto blanda con vendaje, el médico se la quitará ashley la visita de seguimiento.  No use norma propias lentes de contacto hasta que el médico lo autorice.  Instrucciones generales    No conduzca ni opere maquinaria hasta que el médico lo autorice. Es posible que no pueda juzgar sp las distancias si cedeño visión se ve afectada o si usa un parche ocular.  Concurra a todas las visitas de seguimiento. [place] ayuda a prevenir la infección y la pérdida de la visión.  Comuníquese con un médico si:  Continúa con dolor ocular y otros síntomas ashley más de 2 o 3 días.  Tiene síntomas nuevos, stew más enrojecimiento, lagrimeo o líquido (secreción) provenientes del abdulaziz.  Tiene los párpados hinchados.  La visión empeora mucho.  Tiene leoncio secreción que le katalina los ojos pegados a la mañana.  El parche ocular se afloja al punto que puede parpadear.  Los síntomas vuelven a aparecer después de que la abrasión se ha cicatrizado.  Solicite ayuda de inmediato si:  Tiene dolor intenso en el abdulaziz que no mejora con medicamentos.  Sufre pérdida grave de la visión.  Esta información no tiene stew fin reemplazar el consejo del médico. Asegúrese de hacerle al médico cualquier pregunta que tenga.    Document Revised: 01/27/2024 Document Reviewed: 01/27/2024  Elsevier Patient Education © 2024 Elsevier Inc.      Nuestros coordinadores de referencias del departamento de emergencias se comunicarán con usted en las próximas 24 a  48 horas de 9:00 a. m. a 5:00 p. m. (de lunes a viernes) con leoncio cole de seguimiento. Espere leoncio llamada telefónica del hospital en olga lidia período de tiempo. Si no recibe leoncio llamada o si tiene alguna pregunta o inquietud, puede comunicarse con lamonte al [phone] [facility].

## 2024-04-12 NOTE — ED PROVIDER NOTE - CLINICAL SUMMARY MEDICAL DECISION MAKING FREE TEXT BOX
50 yo M presented to ED with foreign body sensation R eye. Dust particles removed with cotton swab, corneal abrasion on R eye on fluorescin exam. Antibiotic eyedrops provided.  Appropriate medications for patient's presenting complaints were ordered and effects were reassessed.  Patient's records (prior hospital, ED visit, and/or nursing home notes if available) were reviewed.  Additional history was obtained from EMS, family, and/or PCP (where available).  Escalation to admission/observation was considered. However patient feels much better and is comfortable with discharge.  Appropriate follow-up was arranged. Return precautions discussed in detail.

## 2024-04-12 NOTE — ED PROVIDER NOTE - PHYSICAL EXAMINATION
Called patient LM on VM, please notify patient letter sent through [facility]. Thank you. VITAL SIGNS: I have reviewed nursing notes and confirm.  CONSTITUTIONAL:  in no acute distress.  SKIN: Skin exam is warm and dry, no acute rash.  HEAD: Normocephalic; atraumatic.  EYES: PERRL, EOM intact; conjunctiva and sclera clear. small particles in eye removed with sterile cotton swab. corneal abrasion to 6 oclock position   ENT: No nasal discharge; airway clear. TMs clear.  CARD: S1, S2 normal; no murmurs, gallops, or rubs. Regular rate and rhythm.  RESP: No wheezes, rales or rhonchi. Speaking in full sentences.

## 2024-04-12 NOTE — ED PROVIDER NOTE - OBJECTIVE STATEMENT
49-year-old male no medical history presenting the ED for evaluation of foreign body to right eye.  Patient states that he was helping a friend build house earlier today around 3 PM when he believes dust/metal fell into his eye.  Denies wearing contacts or lenses

## 2024-04-12 NOTE — ED PROVIDER NOTE - NSFOLLOWUPCLINICS_GEN_ALL_ED_FT
[facility]  Ophthalmolgy  [address], [address]  [place] [address]  Phone: [phone]  Fax:   Follow Up Time: 1-3 Days

## 2024-04-12 NOTE — ED ADULT TRIAGE NOTE - HEART RATE (BEATS/MIN)
Warm 80 Awake/Patient baseline mental status/Symptoms improved/Alert and oriented to person, place and time

## 2024-04-12 NOTE — ED PROVIDER NOTE - ATTENDING APP SHARED VISIT CONTRIBUTION OF CARE
50 yo M no reported pmhx, [place] speaking ( ID used [id number] Brenda) presents to ED for foreign body in R eye. 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 No fall. Pt does not wear glasses or contacts. Reports foreign body sensation and pain to R eye. No blurry vision or vision changes.     CONSTITUTIONAL: NAD.   SKIN: warm, dry  HEAD: Normocephalic; atraumatic.  EYES: +tearing from R eye. no conjunctival injection. PERRLA. EOMI. visual acuity intact b/l. On fluorescin exam +corneal abrasion to R eye and dust (removed with cotton swab)  ENT: MMM.   NECK: Supple.  CARD: RRR.

## 2024-04-12 NOTE — ED PROVIDER NOTE - PATIENT PORTAL LINK FT
You can access the FollowMyHealth Patient Portal offered by [facility] by registering at the following website: [email or web address]. By joining [employer]’s FollowMyHealth portal, you will also be able to view your health information using other applications (apps) compatible with our system.

## 2024-07-29 NOTE — ED ADULT TRIAGE NOTE - NS ED NURSE BANDS TYPE
PCP had asked that I contact patient and let him know that it is fine to wait until April 2025 as he was not having symptoms, so OK to wait.  Let him know if symptoms or other concerns came up to let us know.    Left VM for patient regarding this   Name band;

## 2024-12-13 NOTE — ED ADULT NURSE NOTE - NS ED NOTE ABUSE RESPONSE YN
SW/CM Discharge Plan  Informed patient is ready for discharge.  Patient to be picked up by [facility]. Patient/interested person has been counseled for post hospitalization care.  Patient agrees and understands goals and plan. Initial implementation of the patient’s discharge plan has been arranged, including any devices/equipment needed for discharge. Discharge plan communicated to MD, RN, and patient .    Patient’s discharge destination is Home with services/support.    Selected Continued Care - Admitted Since 12/6/2024       Home Medical Care Coordination complete.      Service Provider Selected Services Address Phone Fax    [facility] [address] [place] [id number] [phone]                 .   Yes

## 2025-02-18 ENCOUNTER — EMERGENCY (EMERGENCY)
Facility: HOSPITAL | Age: 50
LOS: 0 days | Discharge: HOME | End: 2025-02-18
Attending: STUDENT IN AN ORGANIZED HEALTH CARE EDUCATION/TRAINING PROGRAM
Payer: MEDICAID

## 2025-02-18 VITALS
DIASTOLIC BLOOD PRESSURE: 75 MMHG | OXYGEN SATURATION: 98 % | WEIGHT: 185.19 LBS | RESPIRATION RATE: 18 BRPM | TEMPERATURE: 99 F | HEART RATE: 66 BPM | SYSTOLIC BLOOD PRESSURE: 127 MMHG

## 2025-02-18 DIAGNOSIS — M54.50 LOW BACK PAIN, UNSPECIFIED: ICD-10-CM
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
## 2025-02-18 DIAGNOSIS — F17.200 NICOTINE DEPENDENCE, UNSPECIFIED, UNCOMPLICATED: ICD-10-CM

## 2025-02-18 PROCEDURE — 99283 EMERGENCY DEPT VISIT LOW MDM: CPT

## 2025-02-18 PROCEDURE — 99284 EMERGENCY DEPT VISIT MOD MDM: CPT

## 2025-02-18 RX ORDER — IBUPROFEN 600 MG/1
1 TABLET, FILM COATED ORAL
Qty: 16 | Refills: 0
Start: 2025-02-18 | End: 2025-02-21

## 2025-02-18 RX ORDER — IBUPROFEN 600 MG/1
800 TABLET, FILM COATED ORAL ONCE
Refills: 0 | Status: COMPLETED | OUTPATIENT
Start: 2025-02-18 | End: 2025-02-18

## 2025-02-18 RX ORDER — METHOCARBAMOL 500 MG
1500 TABLET ORAL ONCE
Refills: 0 | Status: COMPLETED | OUTPATIENT
Start: 2025-02-18 | End: 2025-02-18

## 2025-02-18 RX ORDER — METHOCARBAMOL 500 MG
2 TABLET ORAL
Qty: 18 | Refills: 0
Start: 2025-02-18 | End: 2025-02-20

## 2025-02-18 RX ORDER — LIDOCAINE HYDROCHLORIDE 30 MG/G
2 CREAM TOPICAL ONCE
Refills: 0 | Status: COMPLETED | OUTPATIENT
Start: 2025-02-18 | End: 2025-02-18

## 2025-02-18 RX ADMIN — Medication 1500 MILLIGRAM(S): at 08:51

## 2025-02-18 RX ADMIN — IBUPROFEN 800 MILLIGRAM(S): 600 TABLET, FILM COATED ORAL at 08:51

## 2025-02-18 RX ADMIN — LIDOCAINE HYDROCHLORIDE 2 PATCH: 30 CREAM TOPICAL at 08:52

## 2025-02-18 NOTE — ED PROVIDER NOTE - PATIENT PORTAL LINK FT
You can access the FollowMyHealth Patient Portal offered by [facility] by registering at the following website: [email or web address]. By joining [employer]’s FollowMyHealth portal, you will also be able to view your health information using other applications (apps) compatible with our system.

## 2025-02-18 NOTE — ED PROVIDER NOTE - OBJECTIVE STATEMENT
49-year-old male who denies significant past medical history presents to the ED for evaluation of lower back pain.  Patient states pain began yesterday afternoon as he bent down to lift an object on the elevator; immediately felt pain in the lower back.  Patient states he has not felt pain like this before.  Patient took Tylenol approximately 1 hour ago without significant relief.  No other injuries, head trauma, or LOC.  Patient denies any recent fever, lightheadedness, abdominal pain, numbness/tingling, or difficulty with urination/defecation.

## 2025-02-18 NOTE — ED ADULT NURSE NOTE - NSFALLCONCLUSION_ED_ALL_ED
Universal Safety Interventions Rhomboid Transposition Flap Text: The defect edges were debeveled with a #15 scalpel blade.  Given the location of the defect and the proximity to free margins a rhomboid transposition flap was deemed most appropriate.  Using a sterile surgical marker, an appropriate rhomboid flap was drawn incorporating the defect.    The area thus outlined was incised deep to adipose tissue with a #15 scalpel blade.  The skin margins were undermined to an appropriate distance in all directions utilizing iris scissors.

## 2025-02-18 NOTE — ED PROVIDER NOTE - CLINICAL SUMMARY MEDICAL DECISION MAKING FREE TEXT BOX
50 yo M no reported pmhx presents to ED for eval of lower back pain for one day - pt reports yesterday he bent down to pick something up and felt pain to b/l lower back since. Not improved with tylenol at home. No weakness/numbness/tingling, difficulty ambulating, bowel or bladder incontinence, inability to urinate, saddle anesthesia, hx of IVDA.     CONSTITUTIONAL: NAD.   SKIN: warm, dry  HEAD: Normocephalic; atraumatic.  ENT: MMM.   NECK: Supple.  CARD: RRR.   RESP: No wheezes, rales or rhonchi.  ABD: soft ntnd.   BACK: no midline tenderness or stepoffs; b/l lumbar paraspinal uscle tenderness.   NEURO: AOx3, CN 2-12 grossly intact. +5/5 strength and sensation wnl in all extremities. No pronator drift, no dysarthria, no ataxia.    plan - meds, reassess. Appropriate medications for patient's presenting complaints were ordered and effects were reassessed.  Patient's records (prior hospital, ED visit, and/or nursing home notes if available) were reviewed.  Additional history was obtained from EMS, family, and/or PCP (where available).  Escalation to admission/observation was considered. However patient feels much better and is comfortable with discharge.  Appropriate follow-up was arranged. Return precautions discussed in detail.

## 2025-02-18 NOTE — ED PROVIDER NOTE - PHYSICAL EXAMINATION
PHYSICAL EXAM: I have reviewed current vital signs.  GENERAL: NAD, well-nourished; well-developed.  HEAD:  Normocephalic, atraumatic.  EYES: Conjunctiva and sclera clear.  ENT: MMM, no erythema/exudates.  NECK: No midline tenderness, paraspinal tenderness, or step-offs.   CHEST/LUNG: Clear to auscultation bilaterally; no wheezes, rales, or rhonchi.  HEART: Regular rate and rhythm, normal S1 and S2; no murmurs, rubs, or gallops.  ABDOMEN: Soft, nontender, nondistended.  BACK: Bilateral paraspinal tenderness to lower lumbar region, no step-offs.   EXTREMITIES:  2+ peripheral pulses; no clubbing, cyanosis, or edema.  PSYCH: Cooperative, appropriate, normal mood and affect.  NEUROLOGY: A&O x 3. Motor 5/5. Sensory intact.  SKIN: Warm and dry.

## 2025-02-18 NOTE — ED PROVIDER NOTE - DIFFERENTIAL DIAGNOSIS
Differential Diagnosis The differential diagnosis for patients clinical presentation includes but is not limited to: MSK sprain, strain, radiculopathy, muscle spasm

## 2025-06-28 ENCOUNTER — EMERGENCY (EMERGENCY)
Facility: HOSPITAL | Age: 50
LOS: 0 days | Discharge: HOME | End: 2025-06-29
Attending: EMERGENCY MEDICINE
Payer: MEDICAID

## 2025-06-28 VITALS
OXYGEN SATURATION: 98 % | HEART RATE: 78 BPM | TEMPERATURE: 99 F | RESPIRATION RATE: 18 BRPM | DIASTOLIC BLOOD PRESSURE: 79 MMHG | SYSTOLIC BLOOD PRESSURE: 136 MMHG

## 2025-06-28 DIAGNOSIS — H92.01 OTALGIA, RIGHT EAR: ICD-10-CM

## 2025-06-28 DIAGNOSIS — H60.91 UNSPECIFIED OTITIS EXTERNA, RIGHT EAR: ICD-10-CM

## 2025-06-28 PROCEDURE — 82962 GLUCOSE BLOOD TEST: CPT

## 2025-06-28 PROCEDURE — 99283 EMERGENCY DEPT VISIT LOW MDM: CPT

## 2025-06-28 PROCEDURE — 99284 EMERGENCY DEPT VISIT MOD MDM: CPT

## 2025-06-28 RX ORDER — AMOXICILLIN AND CLAVULANATE POTASSIUM 500; 125 MG/1; MG/1
1 TABLET, FILM COATED ORAL
Qty: 14 | Refills: 0
Start: 2025-06-28 | End: 2025-07-04

## 2025-06-28 RX ORDER — AMOXICILLIN AND CLAVULANATE POTASSIUM 500; 125 MG/1; MG/1
1 TABLET, FILM COATED ORAL ONCE
Refills: 0 | Status: COMPLETED | OUTPATIENT
Start: 2025-06-28 | End: 2025-06-28

## 2025-06-28 RX ORDER — CIPROFLOXACIN AND DEXAMETHASONE 3; 1 MG/ML; MG/ML
4 SUSPENSION/ DROPS AURICULAR (OTIC)
Qty: 1 | Refills: 0
Start: 2025-06-28 | End: 2025-09-10

## 2025-06-28 RX ORDER — IBUPROFEN 200 MG
800 TABLET ORAL ONCE
Refills: 0 | Status: COMPLETED | OUTPATIENT
Start: 2025-06-28 | End: 2025-06-28

## 2025-06-28 RX ADMIN — Medication 800 MILLIGRAM(S): at 22:31

## 2025-06-28 RX ADMIN — AMOXICILLIN AND CLAVULANATE POTASSIUM 1 TABLET(S): 500; 125 TABLET, FILM COATED ORAL at 23:13
